# Patient Record
Sex: FEMALE | Race: WHITE | NOT HISPANIC OR LATINO
[De-identification: names, ages, dates, MRNs, and addresses within clinical notes are randomized per-mention and may not be internally consistent; named-entity substitution may affect disease eponyms.]

---
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
## 2025-02-26 ENCOUNTER — OFFICE VISIT (OUTPATIENT)
Facility: CLINIC | Age: 20
End: 2025-02-26
Payer: COMMERCIAL

## 2025-02-26 VITALS
WEIGHT: 158.19 LBS | DIASTOLIC BLOOD PRESSURE: 60 MMHG | HEART RATE: 91 BPM | BODY MASS INDEX: 26.35 KG/M2 | HEIGHT: 65 IN | SYSTOLIC BLOOD PRESSURE: 104 MMHG

## 2025-02-26 DIAGNOSIS — Z30.09 ENCOUNTER FOR COUNSELING REGARDING CONTRACEPTION: Primary | ICD-10-CM

## 2025-02-26 DIAGNOSIS — Z01.812 PRE-PROCEDURAL LABORATORY EXAMINATION: ICD-10-CM

## 2025-02-26 LAB
CONTROL LINE PRESENT WITH A CLEAR BACKGROUND (YES/NO): YES YES/NO
KIT LOT #: NORMAL NUMERIC
PREGNANCY TEST, URINE: NEGATIVE

## 2025-02-26 PROCEDURE — 99203 OFFICE O/P NEW LOW 30 MIN: CPT | Performed by: STUDENT IN AN ORGANIZED HEALTH CARE EDUCATION/TRAINING PROGRAM

## 2025-02-26 PROCEDURE — 81025 URINE PREGNANCY TEST: CPT | Performed by: STUDENT IN AN ORGANIZED HEALTH CARE EDUCATION/TRAINING PROGRAM

## 2025-02-26 RX ORDER — MISOPROSTOL 200 UG/1
TABLET ORAL
Qty: 2 TABLET | Refills: 0 | Status: SHIPPED | OUTPATIENT
Start: 2025-02-26

## 2025-02-26 RX ORDER — FLUOXETINE HCL 20 MG
CAPSULE ORAL
COMMUNITY
Start: 2024-10-01

## 2025-02-26 RX ORDER — IBUPROFEN 800 MG/1
800 TABLET, FILM COATED ORAL EVERY 8 HOURS PRN
Qty: 10 TABLET | Refills: 0 | Status: SHIPPED | OUTPATIENT
Start: 2025-02-26

## 2025-02-26 RX ORDER — PROPRANOLOL HYDROCHLORIDE 10 MG/1
10 TABLET ORAL
COMMUNITY

## 2025-02-26 RX ORDER — ONDANSETRON 4 MG/1
4 TABLET, ORALLY DISINTEGRATING ORAL EVERY 6 HOURS PRN
Qty: 10 TABLET | Refills: 0 | Status: SHIPPED | OUTPATIENT
Start: 2025-02-26

## 2025-02-26 NOTE — PROGRESS NOTES
[facility]  Obstetrics and Gynecology   History & Physical  New Patient    Chief complaint:   Chief Complaint   Patient presents with    Procedure     Possible IUD insertion     Contraception     Discuss IUD options     Establish Care     New Patient        Subjective:     HPI: Kesha Love is a 19 year old  with Patient's last menstrual period was 2025 (exact date).     Here to discuss contraceptive options, specifically IUD. Has previously tried Depo, POP, and combined OCP. Is looking for something more long term and possibly without the added side effects of hormones. She only wants it for the purpose of contraception. She does not very heavy periods or cramping.     Chaperone for exam was declined.    PCP: None Pcp   Patient Care Team:  Pcp, None as PCP - General       GYN Hx:   Menarche: 14 years old (2025 12:59 PM)  Period Cycle (Days): Irrgeular (2025 12:59 PM)  Period Duration (Days): 5-6 days (2025 12:59 PM)  Period Flow: heavy (2025 12:59 PM)  Use of Birth Control (if yes, specify type): Condoms (2025 12:59 PM)  Pap Result Notes: under 21 years old (2025 12:59 PM)    Patient's last menstrual period was 2025 (exact date).  Pap Result Notes: under 21 years old    Last pap: <20 yo  Sexual history: Active? yes  Denies history of STDs.   Birth control? Thinking about IUD    Health Maintenance  Breast cancer screening: mammogram age 40  Colon cancer screening: colonoscopy age 45   Osteoporosis screening: DEXA scan age 65    OB History:  OB History    Para Term  AB Living   0 0 0 0 0 0   SAB IAB Ectopic Multiple Live Births   0 0 0 0 0       Meds:  Medications Ordered Prior to Encounter[1]    All:  Allergies[2]    PMH:  Past Medical History:   Diagnosis Date    Abnormal uterine bleeding 2024    Associated with combined pill birth control, bled for four months straight & heavy bleeding during intercourse. Stopped b/c and  bleeding stopped. Menstruation is now normal.    Anxiety n/a 2017    Frequent Panic Attacks. Diagnosed with NARAYAN & OCD.        PSH:  History reviewed. No pertinent surgical history.    Social History:  Social History     Socioeconomic History    Marital status: Single   Tobacco Use    Smoking status: Never    Smokeless tobacco: Never   Substance and Sexual Activity    Alcohol use: Never    Drug use: Never    Sexual activity: Yes     Partners: Male     Birth control/protection: Condom   Other Topics Concern    Caffeine Concern No    Exercise Yes    Seat Belt Yes    Special Diet Yes     Comment: Vegetarian    Stress Concern No    Weight Concern No        Family History:  Family History   Problem Relation Age of Onset    Obesity Mother         Overweight    Psychiatric Mother         Bipolar Disorder    Diabetes Maternal Grandmother         Type II    Obesity Maternal Grandmother         Overweight       Immunization History:    There is no immunization history on file for this patient.    Depression Scale      PHQ-2 SCORE: 0  , done 2/26/2025         Constitutional:  Denies fatigue, night sweats, hot flashes  Eyes:  denies blurred or double vision  Cardiovascular:  denies chest pain or palpitations  Respiratory:  denies shortness of breath  Gastrointestinal:  denies heartburn, abdominal pain, diarrhea or constipation  Genitourinary:  denies dysuria, incontinence, abnormal vaginal discharge, vaginal itching  Musculoskeletal:  denies back pain.  Skin/Breast:  Denies any breast pain, lumps, or discharge.   Neurological:  denies headaches, extremity weakness or numbness.  Psychiatric: denies depression or anxiety.  Endocrine:   denies excessive thirst or urination.  Heme/Lymph:  denies history of anemia, easy bruising or bleeding.    Objective:     Vitals:    02/26/25 1300   BP: 104/60   Pulse: 91   Weight: 158 lb 3.2 oz (71.8 kg)   Height: 65\"       Body mass index is 26.33 kg/m².    Physical Exam:  Constitutional: well  developed, well nourished  Head/Face: normocephalic  Skin/Hair: no unusual rashes or bruises  Extremities: no edema, no cyanosis  Psychiatric:  Oriented to time, place, person and situation. Appropriate mood and affect    Pelvic Exam: deferred    Labs:  No results found for: \"WBC\", \"RBC\", \"HGB\", \"HCT\", \"MCV\", \"MCH\", \"MCHC\", \"RDW\", \"PLT\", \"MPV\"     No results found for: \"GLU\", \"BUN\", \"BUNCREA\", \"CREATSERUM\", \"ANIONGAP\", \"GFR\", \"GFRNAA\", \"GFRAA\", \"CA\", \"OSMOCALC\", \"ALKPHO\", \"AST\", \"ALT\", \"ALKPHOS\", \"BILT\", \"TP\", \"ALB\", \"GLOBULIN\", \"AGRATIO\", \"NA\", \"K\", \"CL\", \"CO2\"    No results found for: \"CHOLEST\", \"TRIG\", \"HDL\", \"LDL\", \"VLDL\", \"TCHDLRATIO\", \"NONHDLC\", \"CHOLHDLRATIO\", \"CALCNONHDL\"     No results found for: \"T4F\", \"TSH\", \"TSHT4\"     No results found for: \"EAG\", \"A1C\"    Imaging:  No results found.     Assessment and Plan:     Kesha Love is a 19 year old  female here for discussion of contraception.      Diagnoses and all orders for this visit:    #Encounter for counseling regarding contraception  - d/w patient options for contraception including OCP, Nuvaring, Depo Provera, LARC (Nexplanon versus IUD), permanent sterilization  - risks, benefits and alternatives discussed   - pt expressed interest in IUD - will decide on copper vs hormonal   - information provided  - pt advised to contact office on contraception choice     RTC for IUD insertion    Peace Orosco MD  [facility] - OBGYN    Note to patient and family:  The 21st Century Cures Act makes medical notes available to patients in the interest of transparency.  However, please be advised that this is a medical document.  It is intended as a peer to peer communication.  It is written in medical language and may contain abbreviations or verbiage that are technical and unfamiliar.  It may appear blunt or direct.  Medical documents are intended to carry relevant information, facts as evident, and the clinical opinion of the practitioner.         [1]    Current Outpatient Medications on File Prior to Visit   Medication Sig Dispense Refill    PROZAC 20 MG Oral Cap       ClonazePAM 0.25 mg Oral Tab 2 (two) times daily as needed.      propranolol 10 MG Oral Tab 1 tablet (10 mg total).       No current facility-administered medications on file prior to visit.   [2] Not on File

## 2025-03-18 ENCOUNTER — OFFICE VISIT (OUTPATIENT)
Facility: CLINIC | Age: 20
End: 2025-03-18
Payer: COMMERCIAL

## 2025-03-18 VITALS
DIASTOLIC BLOOD PRESSURE: 62 MMHG | WEIGHT: 159 LBS | HEIGHT: 65 IN | SYSTOLIC BLOOD PRESSURE: 106 MMHG | BODY MASS INDEX: 26.49 KG/M2 | HEART RATE: 106 BPM

## 2025-03-18 DIAGNOSIS — Z01.812 PRE-PROCEDURAL LABORATORY EXAMINATION: Primary | ICD-10-CM

## 2025-03-18 DIAGNOSIS — Z30.430 ENCOUNTER FOR INSERTION OF INTRAUTERINE CONTRACEPTIVE DEVICE (IUD): ICD-10-CM

## 2025-03-18 LAB
CONTROL LINE PRESENT WITH A CLEAR BACKGROUND (YES/NO): YES YES/NO
KIT LOT #: NORMAL NUMERIC

## 2025-03-18 PROCEDURE — 81025 URINE PREGNANCY TEST: CPT | Performed by: STUDENT IN AN ORGANIZED HEALTH CARE EDUCATION/TRAINING PROGRAM

## 2025-03-18 PROCEDURE — 58300 INSERT INTRAUTERINE DEVICE: CPT | Performed by: STUDENT IN AN ORGANIZED HEALTH CARE EDUCATION/TRAINING PROGRAM

## 2025-03-18 NOTE — PROCEDURES
Procedure: Intrauterine device placement      Indication: contraception  Type of IUD: Luci    Pre-procedure:  UPT: negative  Risks, benefits and alternative discussed.  Risks discussed include uterine perforation, IUD expulsion.  Absolute pregnancy risk is very low, but if pregnancy were confirmed with IUD in place, 50% chance it would be an ectopic pregnancy.      Procedure steps:  Patient placed in lithotomy position.  Bimanual exam done.  Speculum was inserted in the vagina.  Vaginal walls and cervix were cleaned with betadine x3.  Anterior lip of cervix was grasped by tenaculum.  Uterus was sounded to 7 cm.  IUD was placed with the insertion device in the usual fashion at the fundus of the uterus.  Insertion device was removed and strings were cut to 2-3 cm from the cervix.  Tenaculum was removed.  Hemostasis to tenaculum site was achieved with pressure.  Speculum was removed.  Patient tolerated the procedure well.    Post Procedure Instructions:  - Experiencing intermittent cramping and abnormal uterine bleeding for up to 2 months is normal.    - Use a pad to catch any blood or discharge  - Call office if bleeding heavier than a period, chills, fever or severe abdominal pain  - Pelvic rest (nothing in the vagina - douching, vaginal sex or tampons) x1 week  - Follow up in 4-6 weeks for string check

## 2025-03-18 NOTE — PATIENT INSTRUCTIONS
INSTRUCTIONS AFTER IUD INSERTION    Mild cramping, spotting or light to moderate bleeding can be expected over the next several weeks.  You may take an over-the-counter pain medicine, such as acetaminophen (Tylenol), ibuprofen (Advil, Motrin), and naproxen (Aleve) if needed.  Read and follow all instructions on the label.  Avoid douching, using tampons, tub baths or having sex for 2-3 days after your procedure.  We recommend using abstinence or condom use until correct IUD placement is confirmed at your one month follow up visit.  The IUD does NOT protect you against sexually transmitted infections.    Tell your healthcare provider if you have any of the following:    - Excessive bleeding heavier than your usual cycle  - Foul smelling vaginal discharge  - Fever or chills  - Severe, persistent or worsening lower belly or pelvic pain

## 2025-05-08 ENCOUNTER — OFFICE VISIT (OUTPATIENT)
Facility: CLINIC | Age: 20
End: 2025-05-08
Payer: COMMERCIAL

## 2025-05-08 VITALS
WEIGHT: 159 LBS | DIASTOLIC BLOOD PRESSURE: 64 MMHG | SYSTOLIC BLOOD PRESSURE: 110 MMHG | HEART RATE: 75 BPM | HEIGHT: 65 IN | BODY MASS INDEX: 26.49 KG/M2

## 2025-05-08 DIAGNOSIS — Z97.5 IUD (INTRAUTERINE DEVICE) IN PLACE: Primary | ICD-10-CM

## 2025-05-08 PROCEDURE — 99212 OFFICE O/P EST SF 10 MIN: CPT

## 2025-05-08 PROCEDURE — 99459 PELVIC EXAMINATION: CPT

## 2025-05-08 NOTE — PROGRESS NOTES
Kesha Love is a 19 year old female  Patient's last menstrual period was 2025 (exact date).   Chief Complaint   Patient presents with    IUD     Luci IUD check, inserted on 3/18/25   .    OBSTETRICS HISTORY:  OB History    Para Term  AB Living   0 0 0 0 0 0   SAB IAB Ectopic Multiple Live Births   0 0 0 0 0       GYNE HISTORY:      History   Sexual Activity    Sexual activity: Yes    Partners: Male    Birth control/ protection: Condom                 MEDICAL HISTORY:  Past Medical History[1]    SURGICAL HISTORY:  Past Surgical History[2]    SOCIAL HISTORY:  Social History     Socioeconomic History    Marital status: Single     Spouse name: Not on file    Number of children: Not on file    Years of education: Not on file    Highest education level: Not on file   Occupational History    Not on file   Tobacco Use    Smoking status: Never    Smokeless tobacco: Never   Substance and Sexual Activity    Alcohol use: Never    Drug use: Never    Sexual activity: Yes     Partners: Male     Birth control/protection: Condom   Other Topics Concern    Caffeine Concern No    Exercise Yes    Seat Belt Yes    Special Diet Yes     Comment: Vegetarian    Stress Concern No    Weight Concern No   Social History Narrative    Not on file     Social Drivers of Health     Food Insecurity: Not on file   Transportation Needs: Not on file   Stress: Not on file   Housing Stability: Not on file       FAMILY HISTORY:  Family History[3]    MEDICATIONS:  Medications - Current[4]    ALLERGIES:  Allergies[5]      PHYSICAL EXAM:   Pelvic Exam:  External Genitalia: normal appearance, hair distribution, and no lesions  Urethral Meatus:  normal in size, location, without lesions and prolapse  Bladder:  No fullness, masses or tenderness  Vagina:  Normal appearance without lesions, no abnormal discharge  Cervix:  Normal without tenderness on motion, blue strings seen   Uterus: normal in size, contour, position, mobility,  without tenderness  Adnexa: normal without masses or tenderness  Perineum: normal  Anus: no hemorroids     Assessment & Plan:  1. IUD (intrauterine device) in place                     [1]   Past Medical History:   Abnormal uterine bleeding    Associated with combined pill birth control, bled for four months straight & heavy bleeding during intercourse. Stopped b/c and bleeding stopped. Menstruation is now normal.    Anxiety    Frequent Panic Attacks. Diagnosed with NARAYAN & OCD.   [2]   Past Surgical History:  Procedure Laterality Date    Insert intrauterine device  March 18th 2025   [3]   Family History  Problem Relation Age of Onset    Obesity Mother         Overweight    Psychiatric Mother         Bipolar Disorder    Infertility Mother         IVF treatment 6+ years    Diabetes Maternal Grandmother         Type II    Obesity Maternal Grandmother         Overweight   [4]   Current Outpatient Medications:     PROZAC 20 MG Oral Cap, , Disp: , Rfl:     propranolol 10 MG Oral Tab, 1 tablet (10 mg total)., Disp: , Rfl:     ClonazePAM 0.25 mg Oral Tab, 2 (two) times daily as needed. (Patient not taking: Reported on 5/8/2025), Disp: , Rfl:     ondansetron 4 MG Oral Tablet Dispersible, Take 1 tablet (4 mg total) by mouth every 6 (six) hours as needed for Nausea. (Patient not taking: Reported on 5/8/2025), Disp: 10 tablet, Rfl: 0    ibuprofen 800 MG Oral Tab, Take 1 tablet (800 mg total) by mouth every 8 (eight) hours as needed for Pain. Take one tablet about an hour prior to your procedure, and the remaining tablets as needed for cramping after (Patient not taking: Reported on 5/8/2025), Disp: 10 tablet, Rfl: 0  [5]   Allergies  Allergen Reactions    Methylprednisolone OTHER (SEE COMMENTS)

## (undated) NOTE — LETTER
[facility] [place] Department of OB/GYN  After Care Instructions for IUD      Bleeding   You may experience irregular bleeding for the fist 3-6 months.  If your bleeding becomes heavier than a normal menstrual cycle, please contact our office.    Pain  You may experience mild menstrual cramping after the IUD insertion that will typically last 24-48hrs.  You may take Ibuprofen, Tylenol or Aleve to relieve the discomfort.  If you experience severe or persistent pain, please contact our office.       Restrictions    You should avoid sexual intercourse or tampon use for 1 day after insertion.  Please use a backup method of contraception for 4-7 days with the Mirena and Luci.    When to contact our office  If you are experiencing discomfort described as worse than menstrual cramps that is not relieved by ibuprofen   Fever of 100.4 or greater  Vaginal bleeding that is saturating 1 pad per hour    Any discomfort or poking sensation during intercourse or other sexual activity      Follow up in 4-6 weeks for IUD check.   If you have additional questions or concerns, please call us at [phone].

## (undated) NOTE — LETTER
Kesha Love, :2005    CONSENT FOR PROCEDURE/SEDATION    1. I authorize the performance upon Kesha Love  the following: Insertion Intrauterine Device    2. I authorize Dr. Peace Orosco MD (and whomever is designated as the doctor’s assistant), to perform the above-mentioned procedures.    3. If any unforeseen conditions arise during this procedure calling for additional  procedures, operations, or medications (including anesthesia and blood transfusion), I further request and authorize the doctor to do whatever he/she deems advisable in my interest.    4. I consent to the taking and reproduction of any photographs in the course of this procedure for professional purposes.    5. I consent to the administration of such sedation as may be considered necessary or advisable by the physician responsible for this service, with the exception of ______________________________________________________    6. I have been informed by my doctor of the nature and purpose of this procedure sedation, possible alternative methods of treatment, risk involved and possible complications.    7. If I have a Do Not Resuscitate (DNR) order in place, the physician and I (or the individual authorized to consent on my behalf) will discuss and agree as to whether the Do Not Resuscitate (DNR) order will remain in effect or will be discontinued during the performance of the procedure and the applicable recovery period. If the Do Not Resuscitate (DNR) order is discontinued and is to be reinstated following the procedure/recovery period, the physician will determine when the applicable recovery period ends for purposes of reinstating the Do Not Resuscitate (DNR) order.    Signature of Patient:_______________________________________________    Signature of person authorized to consent for patient:  _______________________________________________________________    Relationship to patient:  ____________________________________________    Witness: _________________________________________ Date:___________     Physician Signature: _______________________________ Date:___________

## (undated) NOTE — LETTER
[facility] [place] Department of OB/GYN  After Care Instructions for IUD      Bleeding   You may experience irregular bleeding for the fist 3-6 months.  If your bleeding becomes heavier than a normal menstrual cycle, please contact our office.    Pain  You may experience mild menstrual cramping after the IUD insertion that will typically last 24-48hrs.  You may take Ibuprofen, Tylenol or Aleve to relieve the discomfort.  If you experience severe or persistent pain, please contact our office.       Restrictions    You should avoid sexual intercourse or tampon use for 1 day after insertion.  Please use a backup method of contraception for 4-7 days with the Mirena and Luci.    When to contact our office  If you are experiencing discomfort described as worse than menstrual cramps that is not relieved by ibuprofen   Fever of 100.4 or greater  Vaginal bleeding that is saturating 1 pad per hour    Any discomfort or poking sensation during intercourse or other sexual activity      Follow up in 4-6 weeks for IUD check.   If you have additional questions or concerns, please call us at [phone].